# Patient Record
Sex: MALE | Race: WHITE | Employment: UNEMPLOYED | URBAN - METROPOLITAN AREA
[De-identification: names, ages, dates, MRNs, and addresses within clinical notes are randomized per-mention and may not be internally consistent; named-entity substitution may affect disease eponyms.]

---

## 2019-11-24 ENCOUNTER — HOSPITAL ENCOUNTER (EMERGENCY)
Facility: HOSPITAL | Age: 19
Discharge: HOME | End: 2019-11-24
Attending: EMERGENCY MEDICINE

## 2019-11-24 VITALS
WEIGHT: 150 LBS | OXYGEN SATURATION: 98 % | SYSTOLIC BLOOD PRESSURE: 116 MMHG | DIASTOLIC BLOOD PRESSURE: 71 MMHG | BODY MASS INDEX: 22.73 KG/M2 | RESPIRATION RATE: 15 BRPM | HEIGHT: 68 IN | HEART RATE: 76 BPM | TEMPERATURE: 99 F

## 2019-11-24 DIAGNOSIS — T40.601A OPIATE OVERDOSE, ACCIDENTAL OR UNINTENTIONAL, INITIAL ENCOUNTER (HCC): Primary | ICD-10-CM

## 2019-11-24 PROCEDURE — 99283 EMERGENCY DEPT VISIT LOW MDM: CPT

## 2019-11-24 PROCEDURE — 93005 ELECTROCARDIOGRAM TRACING: CPT

## 2019-11-24 PROCEDURE — 93010 ELECTROCARDIOGRAM REPORT: CPT | Performed by: EMERGENCY MEDICINE

## 2019-11-25 NOTE — ED PROVIDER NOTES
Patient Seen in: [facility] AND [facility] Emergency Department      History   Patient presents with:  Jerry (detox)    Stated Complaint: od    HPI    She presents to the emergency department after overdosing on heroin.   He states that he has snorted heroin in Rate and Rhythm: Normal rate and regular rhythm. Heart sounds: No murmur. Pulmonary:      Effort: Pulmonary effort is normal. No respiratory distress. Breath sounds: Normal breath sounds. Abdominal:      General: There is no distension. soon as possible for a visit          Medications Prescribed:  There are no discharge medications for this patient.

## 2019-11-25 NOTE — ED INITIAL ASSESSMENT (HPI)
Tried heroin for the first time today and it didn't go well. Found by friend OD. Narcan x 2 given by ems. 18g LAC by ems. PT agitated but able to be redirected, for now.

## 2019-11-25 NOTE — ED NOTES
Patient given discharge instructions and all questions answered. Patient is dressed and ambulatory with steady gait to exit. Vitals stayed WNL during entire stay; no respiratory distress noted. A&o x 4.  helped with sanjuanita to take pt home.

## 2019-11-25 NOTE — CM/SW NOTE
Ride assistance requested for Pt. Spoke with Pt - states he has no one to call for a ride home. States he tried calling his friend and his car was impounded.  Pt has 2 cell phones with him, but states neither has a phone number and that they only work on wi

## 2020-01-09 ENCOUNTER — APPOINTMENT (OUTPATIENT)
Dept: GENERAL RADIOLOGY | Facility: HOSPITAL | Age: 20
End: 2020-01-09
Attending: EMERGENCY MEDICINE

## 2020-01-09 ENCOUNTER — HOSPITAL ENCOUNTER (EMERGENCY)
Facility: HOSPITAL | Age: 20
Discharge: HOME | End: 2020-01-09
Attending: EMERGENCY MEDICINE

## 2020-01-09 VITALS
BODY MASS INDEX: 22.73 KG/M2 | HEART RATE: 82 BPM | OXYGEN SATURATION: 96 % | HEIGHT: 68 IN | RESPIRATION RATE: 14 BRPM | DIASTOLIC BLOOD PRESSURE: 66 MMHG | WEIGHT: 150 LBS | SYSTOLIC BLOOD PRESSURE: 117 MMHG | TEMPERATURE: 98 F

## 2020-01-09 DIAGNOSIS — J06.9 VIRAL URI: Primary | ICD-10-CM

## 2020-01-09 DIAGNOSIS — R05.9 COUGH: ICD-10-CM

## 2020-01-09 LAB
ALBUMIN SERPL-MCNC: 4 G/DL (ref 3.4–5)
ALBUMIN/GLOB SERPL: 1.1 {RATIO} (ref 1–2)
ALP LIVER SERPL-CCNC: 82 U/L (ref 45–117)
ALT SERPL-CCNC: 19 U/L (ref 16–61)
ANION GAP SERPL CALC-SCNC: 10 MMOL/L (ref 0–18)
AST SERPL-CCNC: 31 U/L (ref 15–37)
ATRIAL RATE: 100 BPM
BASOPHILS # BLD AUTO: 0.04 X10(3) UL (ref 0–0.2)
BASOPHILS NFR BLD AUTO: 0.6 %
BILIRUB SERPL-MCNC: 0.4 MG/DL (ref 0.1–2)
BUN BLD-MCNC: 8 MG/DL (ref 7–18)
BUN/CREAT SERPL: 8.3 (ref 10–20)
CALCIUM BLD-MCNC: 8.7 MG/DL (ref 8.5–10.1)
CHLORIDE SERPL-SCNC: 104 MMOL/L (ref 98–112)
CO2 SERPL-SCNC: 25 MMOL/L (ref 21–32)
CREAT BLD-MCNC: 0.96 MG/DL (ref 0.7–1.3)
DEPRECATED RDW RBC AUTO: 50.1 FL (ref 35.1–46.3)
EOSINOPHIL # BLD AUTO: 0.16 X10(3) UL (ref 0–0.7)
EOSINOPHIL NFR BLD AUTO: 2.3 %
ERYTHROCYTE [DISTWIDTH] IN BLOOD BY AUTOMATED COUNT: 15.5 % (ref 11–15)
GLOBULIN PLAS-MCNC: 3.8 G/DL (ref 2.8–4.4)
GLUCOSE BLD-MCNC: 84 MG/DL (ref 70–99)
HCT VFR BLD AUTO: 41.4 % (ref 39–53)
HGB BLD-MCNC: 13.6 G/DL (ref 13–17.5)
IMM GRANULOCYTES # BLD AUTO: 0.01 X10(3) UL (ref 0–1)
IMM GRANULOCYTES NFR BLD: 0.1 %
LIPASE SERPL-CCNC: 196 U/L (ref 73–393)
LYMPHOCYTES # BLD AUTO: 2.42 X10(3) UL (ref 1–4)
LYMPHOCYTES NFR BLD AUTO: 35.5 %
M PROTEIN MFR SERPL ELPH: 7.8 G/DL (ref 6.4–8.2)
MCH RBC QN AUTO: 29 PG (ref 26–34)
MCHC RBC AUTO-ENTMCNC: 32.9 G/DL (ref 31–37)
MCV RBC AUTO: 88.3 FL (ref 80–100)
MONOCYTES # BLD AUTO: 0.62 X10(3) UL (ref 0.1–1)
MONOCYTES NFR BLD AUTO: 9.1 %
NEUTROPHILS # BLD AUTO: 3.56 X10 (3) UL (ref 1.5–7.7)
NEUTROPHILS # BLD AUTO: 3.56 X10(3) UL (ref 1.5–7.7)
NEUTROPHILS NFR BLD AUTO: 52.4 %
OSMOLALITY SERPL CALC.SUM OF ELEC: 286 MOSM/KG (ref 275–295)
P AXIS: 63 DEGREES
P-R INTERVAL: 128 MS
PLATELET # BLD AUTO: 323 10(3)UL (ref 150–450)
POTASSIUM SERPL-SCNC: 3.5 MMOL/L (ref 3.5–5.1)
Q-T INTERVAL: 328 MS
QRS DURATION: 78 MS
QTC CALCULATION (BEZET): 423 MS
R AXIS: 73 DEGREES
RBC # BLD AUTO: 4.69 X10(6)UL (ref 4.3–5.7)
SODIUM SERPL-SCNC: 139 MMOL/L (ref 136–145)
T AXIS: 46 DEGREES
VENTRICULAR RATE: 100 BPM
WBC # BLD AUTO: 6.8 X10(3) UL (ref 4–11)

## 2020-01-09 PROCEDURE — 85025 COMPLETE CBC W/AUTO DIFF WBC: CPT | Performed by: EMERGENCY MEDICINE

## 2020-01-09 PROCEDURE — 93005 ELECTROCARDIOGRAM TRACING: CPT

## 2020-01-09 PROCEDURE — 71045 X-RAY EXAM CHEST 1 VIEW: CPT | Performed by: EMERGENCY MEDICINE

## 2020-01-09 PROCEDURE — 80053 COMPREHEN METABOLIC PANEL: CPT | Performed by: EMERGENCY MEDICINE

## 2020-01-09 PROCEDURE — 83690 ASSAY OF LIPASE: CPT | Performed by: EMERGENCY MEDICINE

## 2020-01-09 PROCEDURE — 93010 ELECTROCARDIOGRAM REPORT: CPT

## 2020-01-09 PROCEDURE — 99284 EMERGENCY DEPT VISIT MOD MDM: CPT

## 2020-01-09 PROCEDURE — 96360 HYDRATION IV INFUSION INIT: CPT

## 2020-01-09 PROCEDURE — 99285 EMERGENCY DEPT VISIT HI MDM: CPT

## 2020-01-09 NOTE — ED PROVIDER NOTES
Patient Seen in: [facility] Emergency Department      History   Patient presents with:  Headache    Stated Complaint: headache and chest discomfort.     HPI    55-year-old male presents emergency room with chief complaint of headache and chest discom Resp 01/09/20 0304 16   Temp 01/09/20 0304 98.4 °F (36.9 °C)   Temp src 01/09/20 0304 Oral   SpO2 01/09/20 0300 98 %   O2 Device --        Current:/69   Pulse 76   Temp 98.4 °F (36.9 °C) (Oral)   Resp 18   Ht 172.7 cm (5' 8\")   Wt 68 kg   SpO2 96% CBC W/ DIFFERENTIAL[[id number]]          Abnormal            Final result                 Please view results for these tests on the individual orders. EKG    Rate, intervals and axes as noted on EKG Report.   Rate: 100  Rhythm: Sinus Rhythm  Re

## 2020-01-09 NOTE — CM/SW NOTE
Patient being discharged. Patient reports his parents kicked him out and he is staying in [address] [place] [address]. He reports his parents threw out all of his identification and he is working on getting a new ID and birth certificate so he can get a job.  He also reports he

## 2021-01-08 ENCOUNTER — HOSPITAL ENCOUNTER (EMERGENCY)
Facility: HOSPITAL | Age: 21
Discharge: LEFT BEFORE DISCHARGE | End: 2021-01-08

## 2021-01-08 VITALS
RESPIRATION RATE: 16 BRPM | DIASTOLIC BLOOD PRESSURE: 86 MMHG | WEIGHT: 156 LBS | BODY MASS INDEX: 23.64 KG/M2 | OXYGEN SATURATION: 97 % | SYSTOLIC BLOOD PRESSURE: 124 MMHG | HEIGHT: 68 IN | HEART RATE: 107 BPM | TEMPERATURE: 99 F

## 2021-01-08 NOTE — ED NOTES
Rn spoke to [facility] of patients situation. Pt homeless and uninsured. Brant  to inform patient of referrals.

## 2021-01-08 NOTE — ED NOTES
Regis Redmond spoke to patient and gave patient referrals.  Pt leaving before being evaluated by MD.

## 2021-01-08 NOTE — ED INITIAL ASSESSMENT (HPI)
Pt aox4. Pt presents to ed with deformity to nose. Pt states fell in garage landing on face early Monday morning. Pt uninsured and homeless. [facility] went to [facility] and unable to be evaluated by ENT. Pt denies griffin, denies nv. .  Pt has purple bruising to underneath r

## 2021-01-08 NOTE — CM/SW NOTE
Spoke to the patient in the waiting room. He states that he went to the [facility] for follow up but no ENT specialist are there. He broke his nose a couple days ago. The [facility] referred him to [facility] but they require 1000 dollars up front.  I recommended h

## (undated) NOTE — ED AVS SNAPSHOT
Nikita Haines   MRN: [id number]    Department:  [facility] Emergency Department   Date of Visit:  11/24/2019           Disclosure     Insurance plans vary and the physician(s) referred by the ER may not be covered by your plan.  Please CARE PHYSICIAN AT ONCE OR RETURN IMMEDIATELY TO THE EMERGENCY DEPARTMENT. If you have been prescribed any medication(s), please fill your prescription right away and begin taking the medication(s) as directed.   If you believe that any of the medications

## (undated) NOTE — ED AVS SNAPSHOT
Aj Castillo   MRN: [id number]    Department:  [facility] Emergency Department   Date of Visit:  1/9/2020           Disclosure     Insurance plans vary and the physician(s) referred by the ER may not be covered by your plan.  Please conta tell this physician (or your personal doctor if your instructions are to return to your personal doctor) about any new or lasting problems. The primary care or specialist physician will see patients referred from the [facility] Emergency Department.  Paz Lomeli